# Patient Record
Sex: MALE | Race: WHITE | URBAN - METROPOLITAN AREA
[De-identification: names, ages, dates, MRNs, and addresses within clinical notes are randomized per-mention and may not be internally consistent; named-entity substitution may affect disease eponyms.]

---

## 2020-12-14 ENCOUNTER — HISTORICAL (OUTPATIENT)
Dept: ADMINISTRATIVE | Facility: HOSPITAL | Age: 60
End: 2020-12-14

## 2021-01-18 LAB — FINAL CULTURE: NORMAL

## 2022-04-11 ENCOUNTER — HISTORICAL (OUTPATIENT)
Dept: ADMINISTRATIVE | Facility: HOSPITAL | Age: 62
End: 2022-04-11

## 2022-04-27 VITALS
WEIGHT: 141.13 LBS | HEIGHT: 65 IN | OXYGEN SATURATION: 99 % | SYSTOLIC BLOOD PRESSURE: 130 MMHG | DIASTOLIC BLOOD PRESSURE: 95 MMHG | BODY MASS INDEX: 23.52 KG/M2

## 2022-05-04 NOTE — HISTORICAL [facility] CERNER
This is a historical note converted from [place]. Formatting and pictures may have been removed.  Please reference Faust[facility] for original formatting and attached multimedia. Chief Complaint  cornea check  Physical Exam  Vitals & Measurements  HT:?166.00?cm? WT:?64.000?kg? BMI:?23.23?  ?  ?  OD sc : ??20/20?  OS Low Vision Test : ??Light perception?  Visual Acuity Comments : ??IOP-unable due to ulcer/ prokera?  Tonometry Method : ??Electronic indentation?  Tonometry Time of Day : ??12:13 CST?  ?  Slit lamp examination  Lids/lash/Adnexae:? Normal // Trace edema with matting of eyelids with purulent discharge.  Conjunctiva/sclera:? White & quiet // 2 + Injection  Cornea: Clear // Center involving?ulcer?with total epi defect and underlying infiltrate?6.5 x 7.5 mm; new nidus of?ulcer from?10-12 oclock.?Significant?corneal thinning (>80%), especially?inferiorly near limbus. No foreign body visible but poor view.  Anterior chamber: Deep & quiet // Large hypopyon filling most of AC with?scattered?heme visible on peripheral iris. Well formed.  Iris: flat,?round and reactive?// Peripheral iris visible with some scattered heme, no defects.  Lens: NSC // No view  Vit: Clear // No view  ?   Assessment/Plan  ?   1. Corneal ulcer, left eye  - with recent history of?metallic foreign body in cornea.  - CT scan without metallic foreign body noted. Globe intact.?Ordered imaging to rule out globe injury in setting of corneal foreign body?since there?was no view into AC  - Culture + pseudomonas. pan sensitive  - Today, patient states he?is?using all drops as directed. Denies any changes. Ulcer?significantly worsened?since last examination. Hypopyon visible with scattered heme in AC. Epi defect with associated stromal thinning, most severe inferiorly.  -?Currently on?Vigamox Q1h, tobramycin q1h, ?atropine BID. Doxycycline 100 mg BID, vitamin C 1000 mg daily.  - Due to persistence and somewhat atypical appearance, will plan on obtaining viral  culture today. Will place ProKera?and have?patient continue current drops with follow up Monday, if?ulcer remains unchanged  -fungal cultures taken 12/14/20  -referred to [facility] for fortified abx, starting fort ceftaz and fort vanc q1h (d/c vig/tobra)  -spoke with Dr. [facility] regarding need for cornea specialist, awaiting return call  -?Reiterated the?importance of close follow up, and risk of blindness or loss of vision or loss of eye if they do not take the prescribed medications and follow up as recommended  - Eye shield on?at all times. Return precautions discussed.  - RTC tomorrow  ?   Patients best contact info:?[phone] . Wife (Diane) [phone]   Problem List/Past Medical History  Ongoing  No chronic problems  Historical  No qualifying data  Medications  atropine 1% ophthalmic solution, 2 drop(s), Eye-Left, BID, 1 refills,? ?Investigating  atropine 1% ophthalmic solution, 2 drop(s), Eye-Left, Daily, 1 refills,? ?Investigating  cyclopentolate 0.5% ophthalmic solution, 1 drop(s), Eye-Left, TID  doxycycline hyclate 100 mg oral capsule, 100 mg= 1 cap(s), Oral, BID  moxifloxacin 0.5% ophthalmic solution, 1 drop(s), Eye-Left, q1hr, 1 refills  tobramycin 0.3% ophthalmic solution, 1 drop(s), Eye-Left, q1hr, 3 refills  Vigamox 0.5% ophthalmic solution, 1 drop(s), Eye-Left, q1hr, 3 refills  Allergies  No Known Allergies  No Known Medication Allergies  Social History  Abuse/Neglect  No, 12/14/2020  Alcohol  Past, 12/14/2020  Substance Use  Never, 12/04/2020  Tobacco  Never (less than 100 in lifetime), No, 12/14/2020  Health Maintenance  Health Maintenance  ???Pending?(in the next year)  ??? ??OverDue  ??? ? ? ?Alcohol Misuse Screening due??01/02/20??and every 1??year(s)  ??? ? ? ?Influenza Vaccine due??10/01/20??and every 1??day(s)  ??? ??Due?  ??? ? ? ?Aspirin Therapy for CVD Prevention due??12/14/20??and every 1??year(s)  ??? ? ? ?Blood Pressure Screening due??12/14/20??Unknown Frequency  ??? ?  ? ?Colorectal Screening due??12/14/20??Unknown Frequency  ??? ? ? ?Depression Screening due??12/14/20??Unknown Frequency  ??? ? ? ?Diabetes Screening due??12/14/20??Unknown Frequency  ??? ? ? ?Lipid Screening due??12/14/20??Unknown Frequency  ??? ? ? ?Tetanus Vaccine due??12/14/20??and every 10??year(s)  ??? ? ? ?Zoster Vaccine due??12/14/20??Unknown Frequency  ??? ??Due In Future?  ??? ? ? ?Obesity Screening not due until??01/01/21??and every 1??year(s)  ??? ? ? ?ADL Screening not due until??12/04/21??and every 1??year(s)  ??? ? ? ?Body Mass Index Check not due until??12/11/21??and every 1??year(s)  ???Satisfied?(in the past 1 year)  ??? ??Satisfied?  ??? ? ? ?ADL Screening on??12/04/20.??Satisfied by Camila Bhardwaj LPN.  ??? ? ? ?Blood Pressure Screening on??12/04/20.??Satisfied by Leana Contreras RN  ??? ? ? ?Body Mass Index Check on??12/14/20.??Satisfied by Gustavo Mora  ??? ? ? ?Influenza Vaccine on??12/14/20.??Satisfied by Gustavo Mora  ??? ? ? ?Obesity Screening on??12/14/20.??Satisfied by Gustavo Mora  ?